# Patient Record
Sex: MALE | Race: WHITE | NOT HISPANIC OR LATINO | Employment: FULL TIME | URBAN - METROPOLITAN AREA
[De-identification: names, ages, dates, MRNs, and addresses within clinical notes are randomized per-mention and may not be internally consistent; named-entity substitution may affect disease eponyms.]

---

## 2018-09-05 ENCOUNTER — OFFICE VISIT (OUTPATIENT)
Dept: URGENT CARE | Facility: PHYSICIAN GROUP | Age: 38
End: 2018-09-05
Payer: COMMERCIAL

## 2018-09-05 VITALS
HEART RATE: 78 BPM | DIASTOLIC BLOOD PRESSURE: 80 MMHG | HEIGHT: 75 IN | OXYGEN SATURATION: 98 % | TEMPERATURE: 98.1 F | RESPIRATION RATE: 18 BRPM | SYSTOLIC BLOOD PRESSURE: 128 MMHG | WEIGHT: 255 LBS | BODY MASS INDEX: 31.71 KG/M2

## 2018-09-05 DIAGNOSIS — J02.9 SORE THROAT: ICD-10-CM

## 2018-09-05 LAB
INT CON NEG: NORMAL
INT CON POS: NORMAL
S PYO AG THROAT QL: NEGATIVE

## 2018-09-05 PROCEDURE — 99203 OFFICE O/P NEW LOW 30 MIN: CPT | Performed by: PHYSICIAN ASSISTANT

## 2018-09-05 PROCEDURE — 87880 STREP A ASSAY W/OPTIC: CPT | Performed by: PHYSICIAN ASSISTANT

## 2018-09-05 ASSESSMENT — ENCOUNTER SYMPTOMS
STRIDOR: 0
EYE DISCHARGE: 0
PALPITATIONS: 0
FEVER: 0
EYE PAIN: 0
WHEEZING: 0
HEADACHES: 0
SPUTUM PRODUCTION: 0
HEMOPTYSIS: 0
EYE REDNESS: 0
SORE THROAT: 1
SHORTNESS OF BREATH: 0
TROUBLE SWALLOWING: 1
CHILLS: 0
COUGH: 1

## 2018-09-05 NOTE — PROGRESS NOTES
"Subjective:      Silvano Gipson is a 38 y.o. male who presents with Pharyngitis (since Friday. Daughter dx with strep.)            Pharyngitis    This is a new problem. The current episode started in the past 7 days. The problem has been unchanged. There has been no fever. Associated symptoms include congestion, coughing, ear pain and trouble swallowing. Pertinent negatives include no ear discharge, headaches, shortness of breath or stridor. He has had exposure to strep. He has tried nothing for the symptoms.       Review of Systems   Constitutional: Positive for malaise/fatigue. Negative for chills and fever.   HENT: Positive for congestion, ear pain, sore throat and trouble swallowing. Negative for ear discharge.    Eyes: Negative for pain, discharge and redness.   Respiratory: Positive for cough. Negative for hemoptysis, sputum production, shortness of breath, wheezing and stridor.    Cardiovascular: Negative for chest pain and palpitations.   Skin: Negative for itching and rash.   Neurological: Negative for headaches.   All other systems reviewed and are negative.    PMH:  has no past medical history on file.  MEDS: No current outpatient prescriptions on file.  ALLERGIES: No Known Allergies  SURGHX: History reviewed. No pertinent surgical history.  SOCHX:  reports that he has never smoked. He has never used smokeless tobacco. He reports that he drinks alcohol. He reports that he does not use drugs.  FH: Family history was reviewed, no pertinent findings to report  Medications, Allergies, and current problem list reviewed today in Epic     Objective:     /80   Pulse 78   Temp 36.7 °C (98.1 °F)   Resp 18   Ht 1.905 m (6' 3\")   Wt 115.7 kg (255 lb)   SpO2 98%   BMI 31.87 kg/m²      Physical Exam   Constitutional: He is oriented to person, place, and time. He appears well-developed and well-nourished. He is active.  Non-toxic appearance. He does not have a sickly appearance. He does not appear ill. No " distress. He is not intubated.   HENT:   Head: Normocephalic and atraumatic.   Right Ear: Hearing, tympanic membrane, external ear and ear canal normal.   Left Ear: Hearing, tympanic membrane, external ear and ear canal normal.   Nose: Nose normal.   Mouth/Throat: Uvula is midline, oropharynx is clear and moist and mucous membranes are normal.   Eyes: Conjunctivae, EOM and lids are normal.   Neck: Normal range of motion and full passive range of motion without pain. Neck supple.   Cardiovascular: Regular rhythm, S1 normal, S2 normal and normal heart sounds.  Exam reveals no gallop and no friction rub.    No murmur heard.  Pulmonary/Chest: Effort normal and breath sounds normal. No accessory muscle usage. No apnea, no tachypnea and no bradypnea. He is not intubated. No respiratory distress. He has no decreased breath sounds. He has no wheezes. He has no rhonchi. He has no rales. He exhibits no tenderness.   Musculoskeletal: Normal range of motion.   Neurological: He is alert and oriented to person, place, and time.   Skin: Skin is warm and dry.   Psychiatric: He has a normal mood and affect. His speech is normal and behavior is normal. Judgment and thought content normal.   Vitals reviewed.         Rapid Strep: NEG     Assessment/Plan:     1. Sore throat  - OTC supportive care  - POCT Rapid Strep A    Differential diagnosis, natural history, supportive care discussed. Follow-up with primary care provider within 7-10 days, emergency room precautions discussed.  Patient and/or family appears understanding of information.  Handout and review of patients diagnosis and treatment was discussed extensively.